# Patient Record
Sex: FEMALE | Race: BLACK OR AFRICAN AMERICAN | Employment: FULL TIME | URBAN - METROPOLITAN AREA
[De-identification: names, ages, dates, MRNs, and addresses within clinical notes are randomized per-mention and may not be internally consistent; named-entity substitution may affect disease eponyms.]

---

## 2018-07-31 PROBLEM — E66.01 SEVERE OBESITY (BMI 35.0-39.9): Status: ACTIVE | Noted: 2018-07-31

## 2018-08-02 ENCOUNTER — HOSPITAL ENCOUNTER (OUTPATIENT)
Dept: MAMMOGRAPHY | Age: 48
Discharge: HOME | End: 2018-08-02
Attending: INTERNAL MEDICINE
Payer: COMMERCIAL

## 2018-08-02 DIAGNOSIS — Z12.39 BREAST CANCER SCREENING: ICD-10-CM

## 2018-08-02 PROCEDURE — 77067 SCR MAMMO BI INCL CAD: CPT

## 2022-12-07 ENCOUNTER — HOSPITAL ENCOUNTER (EMERGENCY)
Age: 52
Discharge: HOME | End: 2022-12-08
Attending: EMERGENCY MEDICINE
Payer: COMMERCIAL

## 2022-12-07 ENCOUNTER — TELEPHONE (OUTPATIENT)
Dept: INTERNAL MEDICINE CLINIC | Facility: CLINIC | Age: 52
End: 2022-12-07

## 2022-12-07 ENCOUNTER — HOSPITAL ENCOUNTER (EMERGENCY)
Age: 52
Discharge: ANOTHER HOSPITAL | End: 2022-12-07
Attending: EMERGENCY MEDICINE
Payer: COMMERCIAL

## 2022-12-07 ENCOUNTER — OFFICE VISIT (OUTPATIENT)
Dept: INTERNAL MEDICINE CLINIC | Facility: CLINIC | Age: 52
End: 2022-12-07
Payer: COMMERCIAL

## 2022-12-07 VITALS
OXYGEN SATURATION: 96 % | SYSTOLIC BLOOD PRESSURE: 163 MMHG | WEIGHT: 161 LBS | DIASTOLIC BLOOD PRESSURE: 95 MMHG | TEMPERATURE: 98.7 F | BODY MASS INDEX: 31.61 KG/M2 | RESPIRATION RATE: 20 BRPM | HEIGHT: 60 IN | HEART RATE: 107 BPM

## 2022-12-07 VITALS
WEIGHT: 161 LBS | HEIGHT: 60 IN | SYSTOLIC BLOOD PRESSURE: 150 MMHG | BODY MASS INDEX: 31.61 KG/M2 | HEART RATE: 84 BPM | DIASTOLIC BLOOD PRESSURE: 84 MMHG

## 2022-12-07 DIAGNOSIS — D64.9 ANEMIA, UNSPECIFIED TYPE: Primary | ICD-10-CM

## 2022-12-07 DIAGNOSIS — D50.0 IRON DEFICIENCY ANEMIA DUE TO CHRONIC BLOOD LOSS: ICD-10-CM

## 2022-12-07 DIAGNOSIS — E11.9 DIABETES MELLITUS WITHOUT COMPLICATION (HCC): ICD-10-CM

## 2022-12-07 DIAGNOSIS — I10 ESSENTIAL HYPERTENSION WITH GOAL BLOOD PRESSURE LESS THAN 140/90: Primary | ICD-10-CM

## 2022-12-07 DIAGNOSIS — E04.0 GOITER DIFFUSE: ICD-10-CM

## 2022-12-07 LAB
ALBUMIN SERPL-MCNC: 4.3 G/DL (ref 3.5–5)
ALBUMIN/GLOB SERPL: 1.7 (ref 0.4–1.6)
ALP SERPL-CCNC: 77 U/L (ref 45–117)
ALT SERPL-CCNC: 7 U/L (ref 13–61)
ANION GAP SERPL CALC-SCNC: 12 MMOL/L (ref 2–11)
AST SERPL-CCNC: 19 U/L (ref 15–37)
BASOPHILS # BLD: 0.1 K/UL (ref 0–0.2)
BASOPHILS NFR BLD: 1 % (ref 0–2)
BILIRUB SERPL-MCNC: 0.3 MG/DL (ref 0.2–1.1)
BUN SERPL-MCNC: 6 MG/DL (ref 7–18)
CALCIUM SERPL-MCNC: 9.2 MG/DL (ref 8.3–10.4)
CHLORIDE SERPL-SCNC: 104 MMOL/L (ref 98–107)
CO2 SERPL-SCNC: 22 MMOL/L (ref 21–32)
CREAT SERPL-MCNC: 0.87 MG/DL (ref 0.6–1)
DIFFERENTIAL METHOD BLD: ABNORMAL
EOSINOPHIL # BLD: 0.2 K/UL (ref 0–0.8)
EOSINOPHIL NFR BLD: 2 % (ref 0.5–7.8)
ERYTHROCYTE [DISTWIDTH] IN BLOOD BY AUTOMATED COUNT: 32.5 % (ref 11.9–14.6)
ERYTHROCYTE [DISTWIDTH] IN BLOOD BY AUTOMATED COUNT: 32.7 % (ref 11.9–14.6)
GLOBULIN SER CALC-MCNC: 2.6 G/DL (ref 2.8–4.5)
GLUCOSE SERPL-MCNC: 178 MG/DL (ref 65–100)
HCT VFR BLD AUTO: 15.6 % (ref 35.8–46.3)
HCT VFR BLD AUTO: 17.1 % (ref 35.8–46.3)
HGB BLD-MCNC: 4.1 G/DL (ref 11.7–15.4)
HGB BLD-MCNC: 4.3 G/DL (ref 11.7–15.4)
IMM GRANULOCYTES # BLD AUTO: 0.1 K/UL (ref 0–0.5)
IMM GRANULOCYTES NFR BLD AUTO: 1 % (ref 0–5)
LYMPHOCYTES # BLD: 2.1 K/UL (ref 0.5–4.6)
LYMPHOCYTES NFR BLD: 26 % (ref 13–44)
MCH RBC QN AUTO: 15.8 PG (ref 26.1–32.9)
MCH RBC QN AUTO: 15.9 PG (ref 26.1–32.9)
MCHC RBC AUTO-ENTMCNC: 25.1 G/DL (ref 31.4–35)
MCHC RBC AUTO-ENTMCNC: 26.3 G/DL (ref 31.4–35)
MCV RBC AUTO: 60.5 FL (ref 82–102)
MCV RBC AUTO: 62.6 FL (ref 82–102)
MONOCYTES # BLD: 0.6 K/UL (ref 0.1–1.3)
MONOCYTES NFR BLD: 7 % (ref 4–12)
NEUTS SEG # BLD: 5.1 K/UL (ref 1.7–8.2)
NEUTS SEG NFR BLD: 63 % (ref 43–78)
NRBC # BLD: 0.02 K/UL (ref 0–0.2)
NRBC # BLD: 0.05 K/UL (ref 0–0.2)
PLATELET # BLD AUTO: 555 K/UL (ref 150–450)
PLATELET # BLD AUTO: 624 K/UL (ref 150–450)
PLATELET COMMENT: ABNORMAL
PMV BLD AUTO: ABNORMAL FL (ref 9.4–12.3)
PMV BLD AUTO: ABNORMAL FL (ref 9.4–12.3)
POTASSIUM SERPL-SCNC: 4 MMOL/L (ref 3.5–5.1)
PROT SERPL-MCNC: 6.9 G/DL (ref 6.4–8.2)
RBC # BLD AUTO: 2.58 M/UL (ref 4.05–5.2)
RBC # BLD AUTO: 2.73 M/UL (ref 4.05–5.2)
RBC MORPH BLD: ABNORMAL
RBC MORPH BLD: ABNORMAL
SODIUM SERPL-SCNC: 138 MMOL/L (ref 133–143)
WBC # BLD AUTO: 8.2 K/UL (ref 4.3–11.1)
WBC # BLD AUTO: 8.4 K/UL (ref 4.3–11.1)

## 2022-12-07 PROCEDURE — 3077F SYST BP >= 140 MM HG: CPT | Performed by: INTERNAL MEDICINE

## 2022-12-07 PROCEDURE — 99285 EMERGENCY DEPT VISIT HI MDM: CPT

## 2022-12-07 PROCEDURE — 3079F DIAST BP 80-89 MM HG: CPT | Performed by: INTERNAL MEDICINE

## 2022-12-07 PROCEDURE — 85027 COMPLETE CBC AUTOMATED: CPT

## 2022-12-07 PROCEDURE — 99204 OFFICE O/P NEW MOD 45 MIN: CPT | Performed by: INTERNAL MEDICINE

## 2022-12-07 PROCEDURE — 80053 COMPREHEN METABOLIC PANEL: CPT

## 2022-12-07 RX ORDER — CHLORTHALIDONE 25 MG/1
25 TABLET ORAL DAILY
Qty: 30 TABLET | Refills: 5 | Status: SHIPPED | OUTPATIENT
Start: 2022-12-07

## 2022-12-07 RX ORDER — FERROUS SULFATE 325(65) MG
325 TABLET ORAL EVERY OTHER DAY
Qty: 30 TABLET | Refills: 5 | Status: SHIPPED | OUTPATIENT
Start: 2022-12-07

## 2022-12-07 RX ORDER — AMLODIPINE BESYLATE 5 MG/1
5 TABLET ORAL DAILY
Qty: 30 TABLET | Refills: 5 | Status: SHIPPED | OUTPATIENT
Start: 2022-12-07

## 2022-12-07 RX ORDER — SODIUM CHLORIDE 9 MG/ML
INJECTION, SOLUTION INTRAVENOUS PRN
Status: DISCONTINUED | OUTPATIENT
Start: 2022-12-07 | End: 2022-12-08 | Stop reason: HOSPADM

## 2022-12-07 ASSESSMENT — ENCOUNTER SYMPTOMS
DIARRHEA: 0
ABDOMINAL PAIN: 0
NAUSEA: 0
SORE THROAT: 0
RHINORRHEA: 0
COLOR CHANGE: 0
SHORTNESS OF BREATH: 1
COUGH: 0
SHORTNESS OF BREATH: 1
WHEEZING: 0
BACK PAIN: 0
SHORTNESS OF BREATH: 1
CONSTIPATION: 0
VOMITING: 0

## 2022-12-07 ASSESSMENT — PATIENT HEALTH QUESTIONNAIRE - PHQ9
SUM OF ALL RESPONSES TO PHQ QUESTIONS 1-9: 0
SUM OF ALL RESPONSES TO PHQ9 QUESTIONS 1 & 2: 0
1. LITTLE INTEREST OR PLEASURE IN DOING THINGS: 0
SUM OF ALL RESPONSES TO PHQ QUESTIONS 1-9: 0
2. FEELING DOWN, DEPRESSED OR HOPELESS: 0
SUM OF ALL RESPONSES TO PHQ QUESTIONS 1-9: 0
SUM OF ALL RESPONSES TO PHQ QUESTIONS 1-9: 0

## 2022-12-07 ASSESSMENT — PAIN - FUNCTIONAL ASSESSMENT
PAIN_FUNCTIONAL_ASSESSMENT: NONE - DENIES PAIN
PAIN_FUNCTIONAL_ASSESSMENT: NONE - DENIES PAIN

## 2022-12-07 NOTE — PROGRESS NOTES
SUBJECTIVE:   Phil Guillaume is a 50 y.o. female seen for a visit regarding   Chief Complaint   Patient presents with    Hypertension     Pt here for f/u . Pt has not taken any of her meds in over a year. Norvasc 10 mg, Hygroton 25 mg or her iron 326 mg . Pt is fasting today         Hypertension  This is a chronic (had been on amlodipine and chlorthalidone for years-had covid 9/2021 and got a month supply then but out since then -a year-not checked it any-felt ok) problem. The current episode started more than 1 year ago (last here 7/2018). The problem has been gradually worsening since onset. Associated symptoms include shortness of breath (mild with exertion). Pertinent negatives include no chest pain. Diabetes  She presents for her follow-up diabetic visit. She has type 2 diabetes mellitus. Pertinent negatives for diabetes include no chest pain. When asked about current treatments, none were reported. Home blood sugar record trend: A1C 8/2018 at 7.1-never did take the metformin -no checks since then. Past Medical History, Past Surgical History, Family history, Social History, and Medications were all reviewed with the patient today and updated as necessary. Current Outpatient Medications   Medication Sig Dispense Refill    amLODIPine (NORVASC) 5 MG tablet Take 1 tablet by mouth daily 30 tablet 5    chlorthalidone (HYGROTON) 25 MG tablet Take 1 tablet by mouth daily 30 tablet 5     No current facility-administered medications for this visit.      No Known Allergies  Patient Active Problem List   Diagnosis    Essential hypertension with goal blood pressure less than 140/90    Iron deficiency anemia due to chronic blood loss    Sickle cell trait (HCC)    Impaired fasting glucose    AMA (advanced maternal age) multigravida 35+    Hx gestational diabetes     Social History     Tobacco Use    Smoking status: Not on file    Smokeless tobacco: Not on file   Substance Use Topics    Alcohol use: Not on file          Review of Systems   Constitutional:  Negative for unexpected weight change (about 30 lb lower than 2018). Respiratory:  Positive for shortness of breath (mild with exertion). Cardiovascular:  Negative for chest pain and leg swelling. Endocrine: Negative for cold intolerance and heat intolerance. Genitourinary:  Positive for menstrual problem (mense 7d -mod to heavy). Negative for frequency. Nocturia x1   Hematological:         Hx iron deficiency anemia back to 2017-low ferritin -took iron --not taken in over a year       OBJECTIVE:  BP (!) 150/84   Pulse 84   Ht 5' (1.524 m)   Wt 161 lb (73 kg)   LMP 12/01/2022   BMI 31.44 kg/m²      Physical Exam  Constitutional:       General: She is not in acute distress. Appearance: Normal appearance. Eyes:      Comments: Pale conjunctiva   Neck:      Thyroid: Thyromegaly (large goiter-not tender) present. Cardiovascular:      Rate and Rhythm: Normal rate and regular rhythm. Heart sounds: No murmur heard. Pulmonary:      Breath sounds: No wheezing or rhonchi. Skin:     Coloration: Skin is pale. Medical problems and test results were reviewed with the patient today. ASSESSMENT and PLAN     1. Essential hypertension with goal blood pressure less than 140/90  -     amLODIPine (NORVASC) 5 MG tablet; Take 1 tablet by mouth daily, Disp-30 tablet, R-5Normal  -     chlorthalidone (HYGROTON) 25 MG tablet; Take 1 tablet by mouth daily, Disp-30 tablet, R-5Normal  2. Iron deficiency anemia due to chronic blood loss  -     Ferritin; Future  3. Goiter diffuse  -     TSH with Reflex; Future  4. Diabetes mellitus without complication ([facility]  -     CBC with Auto Differential; Future  -     Comprehensive Metabolic Panel; Future  -     Lipid Panel;  Future  -     Hemoglobin A1C; Future   Several issues --for BP restart prior meds ; recheck A1C, glucose-weight down hopefully has helped; goiter moderate -no compressive sx -check TSH-pale and has sx -likely moderate anemia at least --restart iron and follow in a month  lab results and schedule for future lab studies reviewed with patient  reviewed medications and side effects in detail     Return in about 1 month (around 1/7/2023) for For BP recheck.

## 2022-12-08 ENCOUNTER — TELEPHONE (OUTPATIENT)
Dept: INTERNAL MEDICINE CLINIC | Facility: CLINIC | Age: 52
End: 2022-12-08

## 2022-12-08 VITALS
RESPIRATION RATE: 16 BRPM | DIASTOLIC BLOOD PRESSURE: 102 MMHG | HEART RATE: 91 BPM | SYSTOLIC BLOOD PRESSURE: 176 MMHG | TEMPERATURE: 98.6 F | OXYGEN SATURATION: 100 %

## 2022-12-08 DIAGNOSIS — D50.0 IRON DEFICIENCY ANEMIA DUE TO CHRONIC BLOOD LOSS: Primary | ICD-10-CM

## 2022-12-08 LAB
ALBUMIN SERPL-MCNC: 4.2 G/DL (ref 3.5–5)
ALBUMIN/GLOB SERPL: 1.4 (ref 0.4–1.6)
ALP SERPL-CCNC: 75 U/L (ref 50–136)
ALT SERPL-CCNC: 16 U/L (ref 12–65)
ANION GAP SERPL CALC-SCNC: 5 MMOL/L (ref 2–11)
AST SERPL-CCNC: 16 U/L (ref 15–37)
BILIRUB SERPL-MCNC: 0.3 MG/DL (ref 0.2–1.1)
BUN SERPL-MCNC: 6 MG/DL (ref 6–23)
CALCIUM SERPL-MCNC: 8.7 MG/DL (ref 8.3–10.4)
CHLORIDE SERPL-SCNC: 110 MMOL/L (ref 101–110)
CHOLEST SERPL-MCNC: 179 MG/DL
CO2 SERPL-SCNC: 24 MMOL/L (ref 21–32)
CREAT SERPL-MCNC: 1 MG/DL (ref 0.6–1)
FERRITIN SERPL-MCNC: 2 NG/ML (ref 8–388)
GLOBULIN SER CALC-MCNC: 3 G/DL (ref 2.8–4.5)
GLUCOSE SERPL-MCNC: 93 MG/DL (ref 65–100)
HCT VFR BLD AUTO: 17.3 % (ref 35.8–46.3)
HCT VFR BLD AUTO: 27 % (ref 35.8–46.3)
HDLC SERPL-MCNC: 45 MG/DL (ref 40–60)
HDLC SERPL: 4
HGB BLD-MCNC: 4.4 G/DL (ref 11.7–15.4)
HGB BLD-MCNC: 8 G/DL (ref 11.7–15.4)
HISTORY CHECK: NORMAL
LDLC SERPL CALC-MCNC: 111.8 MG/DL
POTASSIUM SERPL-SCNC: 4.5 MMOL/L (ref 3.5–5.1)
PROT SERPL-MCNC: 7.2 G/DL (ref 6.3–8.2)
SODIUM SERPL-SCNC: 139 MMOL/L (ref 133–143)
TRIGL SERPL-MCNC: 111 MG/DL (ref 35–150)
TSH W FREE THYROID IF ABNORMAL: 0.53 UIU/ML (ref 0.36–3.74)
VLDLC SERPL CALC-MCNC: 22.2 MG/DL (ref 6–23)

## 2022-12-08 PROCEDURE — 85014 HEMATOCRIT: CPT

## 2022-12-08 PROCEDURE — 86923 COMPATIBILITY TEST ELECTRIC: CPT

## 2022-12-08 PROCEDURE — 36430 TRANSFUSION BLD/BLD COMPNT: CPT

## 2022-12-08 PROCEDURE — P9016 RBC LEUKOCYTES REDUCED: HCPCS

## 2022-12-08 PROCEDURE — 86901 BLOOD TYPING SEROLOGIC RH(D): CPT

## 2022-12-08 NOTE — ED TRIAGE NOTES
Arrives ambulatory with steady gait into triage. Reports seen by PMD today for refill on bp meds, lab work done at that time. Called tonight r/t hemoglobin 4. Denies hx of same. Reports shortness of breath when going upstairs only. Reports fatigue. Denies chest pain, n/v/d, fever/chills. Currently on menstrual cycle and reports heavy periods. Denies bloody/black stools or abdominal pain. Started on iron pills today, taken 1.5 hours pta. Mucuos membranes appear pale in triage.

## 2022-12-08 NOTE — ED NOTES
I have reviewed discharge instructions with the patient. The patient verbalized understanding. Patient left ED via Discharge Method: ambulatory to Home with  who will be coming to pick her up. Opportunity for questions and clarification provided. Patient given 0 scripts. To continue your aftercare when you leave the hospital, you may receive an automated call from our care team to check in on how you are doing. This is a free service and part of our promise to provide the best care and service to meet your aftercare needs.  If you have questions, or wish to unsubscribe from this service please call [phone]. Thank you for Choosing our [facility] Emergency Department.       Janelle Momin RN  12/08/22 1991

## 2022-12-08 NOTE — TELEPHONE ENCOUNTER
FYI: Patient was sent to the ER yesterday due to low hemoglobin results (4.3). She went to the hospital and it reported as 4.1 and was advise to go ahead and get a transfusion. She had the transfusion this morning (2 bags) and her hemoglobin reported as 8. The hospital is requesting that she comes back sooner than January due to these results.  Scheduled anappointment for 12/12

## 2022-12-08 NOTE — ED PROVIDER NOTES
Emergency Department Provider Note                   PCP:                Jennifer Espinoza MD               Age: 50 y.o. Sex: female       ICD-10-CM    1. Anemia, unspecified type  D64.9           DISPOSITION Decision To Transfer 12/07/2022 10:40:24 PM        MDM  Number of Diagnoses or Management Options  Anemia, unspecified type: new, needed workup     Amount and/or Complexity of Data Reviewed  Clinical lab tests: ordered and reviewed  Review and summarize past medical records: yes    Risk of Complications, Morbidity, and/or Mortality  Presenting problems: moderate  Diagnostic procedures: minimal  Management options: moderate    Patient Progress  Patient progress: stable             Orders Placed This Encounter   Procedures    CBC    Comprehensive Metabolic Panel    EKG 12 Lead    Insert peripheral IV        Medications - No data to display    New Prescriptions    No medications on file        Shan Cruz is a 50 y.o. female who presents to the Emergency Department with chief complaint of    Chief Complaint   Patient presents with    Abnormal Lab      82-year-old black female with a history of HTN, iron deficiency anemia, and chronic menorrhagia presents to the emergency department complaining of abnormal lab she had earlier today and was told to come to the ER. According the patient, she had been out of her blood pressure medicine and went to follow-up with her primary care doctor earlier today, had blood work done and was restarted on her blood pressure medicine. She was called back by someone in the office today and told she needed to go to the ER because her hemoglobin was too low. She denies any bleeding at this time and states she does admit to getting short of breath walking upstairs but minimal shortness of breath with daily activity and no chest pain. The history is provided by the patient and the spouse. Review of Systems   Constitutional:  Negative for chills and fever. Respiratory:  Positive for shortness of breath (with exertion). Negative for wheezing. Cardiovascular:  Negative for chest pain. All other systems reviewed and are negative. No past medical history on file. No past surgical history on file. No family history on file. Social History     Socioeconomic History    Marital status:          Patient has no known allergies. Previous Medications    AMLODIPINE (NORVASC) 5 MG TABLET    Take 1 tablet by mouth daily    CHLORTHALIDONE (HYGROTON) 25 MG TABLET    Take 1 tablet by mouth daily        Vitals signs and nursing note reviewed. Patient Vitals for the past 4 hrs:   Temp Pulse Resp BP SpO2   12/07/22 2235 -- (!) 107 20 (!) 163/95 96 %   12/07/22 2054 99.5 °F (37.5 °C) (!) 120 16 (!) 172/89 100 %          Physical Exam  Vitals and nursing note reviewed. Constitutional:       General: She is not in acute distress. HENT:      Head: Normocephalic and atraumatic. Right Ear: External ear normal.      Left Ear: External ear normal.      Nose: Nose normal.      Mouth/Throat:      Mouth: Mucous membranes are moist.   Eyes:      Extraocular Movements: Extraocular movements intact. Conjunctiva/sclera: Conjunctivae normal.      Pupils: Pupils are equal, round, and reactive to light. Cardiovascular:      Rate and Rhythm: Regular rhythm. Tachycardia present. Heart sounds: No murmur heard. Pulmonary:      Effort: Pulmonary effort is normal.      Breath sounds: Normal breath sounds. Abdominal:      General: Abdomen is flat. Palpations: There is no mass. Tenderness: There is no abdominal tenderness. There is no right CVA tenderness or left CVA tenderness. Musculoskeletal:         General: Normal range of motion. Cervical back: Normal range of motion and neck supple. Skin:     General: Skin is warm and dry. Coloration: Skin is pale. Neurological:      General: No focal deficit present.       Mental Status: She is alert and oriented to person, place, and time. Psychiatric:         Mood and Affect: Mood and affect normal.         Speech: Speech normal.        Procedures    The patient was observed in the ED. due to her extremely low hemoglobin as well as tachycardia and dyspnea on exertion, the patient be transferred over to [facility] for transfusion of 2 units of blood. Dr. Vicki Kimball will assume care on transfer, and arrange follow-up with Dr. Mckenzie Mccarthy as an outpatient. Patient be given a prescription for iron supplementation to be taken every other day. Results Reviewed:      Recent Results (from the past 24 hour(s))   CBC with Auto Differential    Collection Time: 12/07/22 11:35 AM   Result Value Ref Range    WBC 8.2 4.3 - 11.1 K/uL    RBC 2.73 (L) 4.05 - 5.2 M/uL    Hemoglobin 4.3 (LL) 11.7 - 15.4 g/dL    Hematocrit 17.1 (L) 35.8 - 46.3 %    MCV 62.6 (L) 82 - 102 FL    MCH 15.8 (L) 26.1 - 32.9 PG    MCHC 25.1 (L) 31.4 - 35.0 g/dL    RDW 32.7 (H) 11.9 - 14.6 %    Platelets 805 (H) 898 - 450 K/uL    MPV Unable to calculate. Recommend adding IPF.  9.4 - 12.3 FL    nRBC 0.02 0.0 - 0.2 K/uL    Differential Type AUTOMATED      Seg Neutrophils 63 43 - 78 %    Lymphocytes 26 13 - 44 %    Monocytes 7 4.0 - 12.0 %    Eosinophils % 2 0.5 - 7.8 %    Basophils 1 0.0 - 2.0 %    Immature Granulocytes 1 0.0 - 5.0 %    Segs Absolute 5.1 1.7 - 8.2 K/UL    Absolute Lymph # 2.1 0.5 - 4.6 K/UL    Absolute Mono # 0.6 0.1 - 1.3 K/UL    Absolute Eos # 0.2 0.0 - 0.8 K/UL    Basophils Absolute 0.1 0.0 - 0.2 K/UL    Absolute Immature Granulocyte 0.1 0.0 - 0.5 K/UL    RBC Comment HYPOCHROMIA      RBC Comment SLIGHT  ANISOCYTOSIS + POIKILOCYTOSIS        Platelet Comment INCREASED     CBC    Collection Time: 12/07/22  9:30 PM   Result Value Ref Range    WBC 8.4 4.3 - 11.1 K/uL    RBC 2.58 (L) 4.05 - 5.20 M/uL    Hemoglobin 4.1 (LL) 11.7 - 15.4 g/dL    Hematocrit 15.6 (L) 35.8 - 46.3 %    MCV 60.5 (L) 82.0 - 102.0 FL    MCH 15.9 (L) 26.1 - 32.9 PG MCHC 26.3 (L) 31.4 - 35.0 g/dL    RDW 32.5 (H) 11.9 - 14.6 %    Platelets 317 (H) 571 - 450 K/uL    MPV Unable to calculate. Recommend adding IPF. 9.4 - 12.3 FL    nRBC 0.05 0.0 - 0.2 K/uL   Comprehensive Metabolic Panel    Collection Time: 12/07/22  9:30 PM   Result Value Ref Range    Sodium 138 133 - 143 mmol/L    Potassium 4.0 3.5 - 5.1 mmol/L    Chloride 104 98 - 107 mmol/L    CO2 22 21 - 32 mmol/L    Anion Gap 12 (H) 2 - 11 mmol/L    Glucose 178 (H) 65 - 100 mg/dL    BUN 6 (L) 7.0 - 18.0 MG/DL    Creatinine 0.87 0.6 - 1.0 MG/DL    Est, Glom Filt Rate >60 >60 ml/min/1.73m2    Calcium 9.2 8.3 - 10.4 MG/DL    Total Bilirubin 0.3 0.2 - 1.1 MG/DL    ALT 7 (L) 13.0 - 61.0 U/L    AST 19 15 - 37 U/L    Alk Phosphatase 77 45.0 - 117.0 U/L    Total Protein 6.9 6.4 - 8.2 g/dL    Albumin 4.3 3.5 - 5.0 g/dL    Globulin 2.6 (L) 2.8 - 4.5 g/dL    Albumin/Globulin Ratio 1.7 (H) 0.4 - 1.6         I discussed the results of all labs, procedures, radiographs, and treatments with the patient and available family. Treatment plan is agreed upon and the patient is ready for discharge. All voiced understanding of the discharge plan and medication instructions or changes as appropriate. Questions about treatment in the ED were answered. All were encouraged to return should symptoms worsen or new problems develop. Voice dictation software was used during the making of this note. This software is not perfect and grammatical and other typographical errors may be present. This note has not been completely proofread for errors.      Jignesh Pelaez MD  12/07/22 [id number]

## 2022-12-08 NOTE — ED NOTES
TRANSFER - OUT REPORT:    Verbal report given to Michael Nolan RN  on Aissatou Blade  being transferred to [facility] [address] ED for routine progression of patient care       Report consisted of patient's Situation, Background, Assessment and   Recommendations(SBAR). Information from the following report(s) Nurse Handoff Report, ED Encounter Summary, Adult Overview, Recent Results, and Cardiac Rhythm ST  was reviewed with the receiving nurse. [place] Assessment: Presents to emergency department  because of falls (Syncope, seizure, or loss of consciousness): No, Age > 79: No, Altered Mental Status, Intoxication with alcohol or substance confusion (Disorientation, impaired judgment, poor safety awaremess, or inability to follow instructions): No, Impaired Mobility: Ambulates or transfers with assistive devices or assistance; Unable to ambulate or transer.: No  Lines:   Peripheral IV 12/07/22 Left Antecubital (Active)   Site Assessment Clean, dry & intact 12/07/22 2132   Line Status Blood return noted; Flushed;Normal saline locked;Brisk blood return 12/07/22 2132   Phlebitis Assessment No symptoms 12/07/22 2132   Infiltration Assessment 0 12/07/22 2132   Alcohol Cap Used No 12/07/22 2132   Dressing Status New dressing applied;Clean, dry & intact 12/07/22 2132   Dressing Type Transparent 12/07/22 2132   Dressing Intervention Dressing changed;New 12/07/22 2132        Opportunity for questions and clarification was provided.       Patient transported with:  Monitor michell Rashid RN  12/07/22 [id number]

## 2022-12-08 NOTE — ED PROVIDER NOTES
Emergency Department Provider Note                   PCP:                Christiano Stephens MD               Age: 50 y.o. Sex: female       ICD-10-CM    1. Anemia, unspecified type  D64.9           DISPOSITION Decision To Discharge 12/08/2022 07:20:11 AM       MDM  Number of Diagnoses or Management Options  Anemia, unspecified type  Diagnosis management comments: Patient was severely anemic with some weakness and fatigue. After 2 units of blood patient's hemoglobin is now 8. She feels much better. We will have her continue her iron supplements and follow-up with Dr. Uli Orozco closely for further monitoring and evaluation of her anemia. Amount and/or Complexity of Data Reviewed  Clinical lab tests: ordered and reviewed  Tests in the medicine section of CPT®: ordered and reviewed    Patient Progress  Patient progress: stable             Orders Placed This Encounter   Procedures    Hemoglobin and Hematocrit    Hemoglobin and Hematocrit    Verify hospital blood product consent form has been signed and witnessed    Vital Signs For Blood Product Transfusion    Transfusion Reaction Management    TYPE AND SCREEN    PREPARE RBC (CROSSMATCH), 2 Units        Medications   0.9 % sodium chloride infusion (has no administration in time range)       New Prescriptions    No medications on file        Sanya Lopez is a 50 y.o. female who presents to the Emergency Department with chief complaint of    Chief Complaint   Patient presents with    Abnormal Lab      Patient with a history of iron deficiency anemia. She has taken iron supplements in the past but none currently. She also has very heavy menstrual periods which usually last 7 days. She still has these every month and is just finishing up her seventh day this month. States the bleeding was very heavy but is currently light and almost finished.   She went to her primary care doctor to reestablish care today specifically to get a refill of her blood pressure medication. They check some routine blood work and called her this evening with a low hemoglobin of 4.3. Patient went to the ER at [facility]. They rechecked and confirmed hemoglobin of 4.1. They do not have blood products there and sent patient here to the ER for a blood transfusion and possible discharge with PCP follow-up. I checked patient's last hemoglobin in the records and it was in 2018 with a hemoglobin of 11. There is no recent blood work noted. Patient states she has some fatigue and shortness of breath mainly with going up stairs. Otherwise she is doing well. I discussed options of blood transfusion and repeat CBC and options of being admitted versus being discharged. She would like to get the blood transfusion and recheck hemoglobin and hopefully be discharged. The history is provided by the patient. No  was used. Abnormal Lab  This is a new problem. The current episode started 3 to 5 hours ago. The problem occurs constantly. The problem has not changed since onset. Associated symptoms include shortness of breath. Pertinent negatives include no chest pain, no abdominal pain and no headaches. Nothing aggravates the symptoms. Nothing relieves the symptoms. She has tried nothing for the symptoms. All other systems reviewed and are negative unless otherwise stated in the history of present illness section. Review of Systems   Constitutional:  Positive for fatigue. Negative for chills and fever. HENT:  Negative for rhinorrhea and sore throat. Respiratory:  Positive for shortness of breath. Negative for cough. Cardiovascular:  Negative for chest pain and palpitations. Gastrointestinal:  Negative for abdominal pain, constipation, diarrhea, nausea and vomiting. Genitourinary:  Positive for vaginal bleeding. Negative for dysuria and hematuria. Musculoskeletal:  Negative for back pain and neck pain. Skin:  Negative for color change and rash. Neurological:  Negative for numbness and headaches. Psychiatric/Behavioral:  Negative for confusion. All other systems reviewed and are negative. No past medical history on file. No past surgical history on file. No family history on file. Social History     Socioeconomic History    Marital status:         Allergies: Patient has no known allergies. Previous Medications    AMLODIPINE (NORVASC) 5 MG TABLET    Take 1 tablet by mouth daily    CHLORTHALIDONE (HYGROTON) 25 MG TABLET    Take 1 tablet by mouth daily    FERROUS SULFATE (IRON 325) 325 (65 FE) MG TABLET    Take 1 tablet by mouth every other day        Vitals signs and nursing note reviewed. Patient Vitals for the past 4 hrs:   Temp Pulse Resp BP SpO2   12/08/22 0602 -- 93 -- (!) 178/89 100 %   12/08/22 0501 -- 87 -- (!) 170/81 99 %   12/08/22 0432 -- 80 -- (!) 178/86 100 %   12/08/22 0400 -- 84 -- (!) 164/91 100 %   12/08/22 0356 -- 85 -- (!) 163/91 100 %   12/08/22 0352 -- 83 -- (!) 165/101 100 %   12/08/22 0347 98.6 °F (37 °C) 84 16 (!) 163/95 100 %          Physical Exam  Vitals and nursing note reviewed. Constitutional:       Appearance: Normal appearance. HENT:      Head: Normocephalic and atraumatic. Eyes:      Comments: Pale conjunctivae     Cardiovascular:      Rate and Rhythm: Regular rhythm. Tachycardia present. Pulmonary:      Effort: Pulmonary effort is normal. No respiratory distress. Breath sounds: Normal breath sounds. No wheezing. Abdominal:      General: Bowel sounds are normal.      Palpations: Abdomen is soft. Tenderness: There is no abdominal tenderness. Musculoskeletal:         General: No swelling. Normal range of motion. Cervical back: Normal range of motion. No tenderness. Skin:     General: Skin is warm and dry. Neurological:      Mental Status: She is alert.         Procedures        Results for orders placed or performed during the hospital encounter of 12/07/22 Hemoglobin and Hematocrit   Result Value Ref Range    Hemoglobin 4.4 (LL) 11.7 - 15.4 g/dL    Hematocrit 17.3 (L) 35.8 - 46.3 %   Hemoglobin and Hematocrit   Result Value Ref Range    Hemoglobin 8.0 (L) 11.7 - 15.4 g/dL    Hematocrit 27.0 (L) 35.8 - 46.3 %   TYPE AND SCREEN   Result Value Ref Range    Crossmatch expiration date 12/11/2022,2359     ABO/Rh O POSITIVE     Antibody Screen NEG     Unit Number [id number]     Product Code Blood Bank RC LR     Unit Divison 00     Dispense Status Blood Bank ISSUED     Crossmatch Result Compatible     Unit Number [id number]     Product Code Blood Bank RC LR     Unit Divison 00     Dispense Status Blood Bank ISSUED     Crossmatch Result Compatible    PREPARE RBC (CROSSMATCH), 2 Units   Result Value Ref Range    History Check Historical check performed         No orders to display                         Voice dictation software was used during the making of this note. This software is not perfect and grammatical and other typographical errors may be present. This note has not been completely proofread for errors.      Ludin Lewis III, MD  12/08/22 [id number]

## 2022-12-08 NOTE — PROGRESS NOTES
Spoke to patient on the phone. She endorsed worsening shortness of breath and fatigue. Denies and known gi blood loss. Recommended she be seen in the ED for a recheck of lab, likely transfusion, and further workup. She endorses understanding and stated she would go now. All questions answered.

## 2022-12-08 NOTE — TELEPHONE ENCOUNTER
Spoke with pt and advised that per Dr. Cara Hicks, he reviewed lab results and suggests a CBC recheck mid next week. Pt agreed. Appt cancelled and pt aware.

## 2022-12-09 LAB
ABO + RH BLD: NORMAL
BLD PROD TYP BPU: NORMAL
BLD PROD TYP BPU: NORMAL
BLOOD BANK DISPENSE STATUS: NORMAL
BLOOD BANK DISPENSE STATUS: NORMAL
BLOOD GROUP ANTIBODIES SERPL: NORMAL
BPU ID: NORMAL
BPU ID: NORMAL
CROSSMATCH RESULT: NORMAL
CROSSMATCH RESULT: NORMAL
SPECIMEN EXP DATE BLD: NORMAL
UNIT DIVISION: 0
UNIT DIVISION: 0

## 2022-12-11 LAB
EST. AVERAGE GLUCOSE BLD GHB EST-MCNC: ABNORMAL MG/DL
HBA1C MFR BLD: <3.8 % (ref 4.8–5.6)

## 2022-12-13 DIAGNOSIS — D50.0 IRON DEFICIENCY ANEMIA DUE TO CHRONIC BLOOD LOSS: ICD-10-CM

## 2022-12-13 LAB
BASOPHILS # BLD: 0.1 K/UL (ref 0–0.2)
BASOPHILS NFR BLD: 1 % (ref 0–2)
DIFFERENTIAL METHOD BLD: ABNORMAL
EOSINOPHIL # BLD: 0.1 K/UL (ref 0–0.8)
EOSINOPHIL NFR BLD: 2 % (ref 0.5–7.8)
ERYTHROCYTE [DISTWIDTH] IN BLOOD BY AUTOMATED COUNT: ABNORMAL %
HCT VFR BLD AUTO: 31 % (ref 35.8–46.3)
HGB BLD-MCNC: 8.3 G/DL (ref 11.7–15.4)
IMM GRANULOCYTES # BLD AUTO: 0 K/UL (ref 0–0.5)
IMM GRANULOCYTES NFR BLD AUTO: 0 % (ref 0–5)
LYMPHOCYTES # BLD: 1.7 K/UL (ref 0.5–4.6)
LYMPHOCYTES NFR BLD: 23 % (ref 13–44)
MCH RBC QN AUTO: 19.3 PG (ref 26.1–32.9)
MCHC RBC AUTO-ENTMCNC: 26.8 G/DL (ref 31.4–35)
MCV RBC AUTO: 72.3 FL (ref 82–102)
MONOCYTES # BLD: 0.5 K/UL (ref 0.1–1.3)
MONOCYTES NFR BLD: 7 % (ref 4–12)
NEUTS SEG # BLD: 5 K/UL (ref 1.7–8.2)
NEUTS SEG NFR BLD: 67 % (ref 43–78)
NRBC # BLD: 0 K/UL (ref 0–0.2)
PLATELET # BLD AUTO: 612 K/UL (ref 150–450)
PLATELET COMMENT: ABNORMAL
PMV BLD AUTO: ABNORMAL FL (ref 9.4–12.3)
RBC # BLD AUTO: 4.29 M/UL (ref 4.05–5.2)
RBC MORPH BLD: ABNORMAL
WBC # BLD AUTO: 7.4 K/UL (ref 4.3–11.1)
WBC MORPH BLD: ABNORMAL

## 2022-12-14 ENCOUNTER — TELEPHONE (OUTPATIENT)
Dept: INTERNAL MEDICINE CLINIC | Facility: CLINIC | Age: 52
End: 2022-12-14

## 2022-12-14 DIAGNOSIS — D50.0 IRON DEFICIENCY ANEMIA DUE TO CHRONIC BLOOD LOSS: Primary | ICD-10-CM

## 2022-12-14 DIAGNOSIS — D64.9 LOW HEMOGLOBIN: ICD-10-CM

## 2022-12-14 NOTE — TELEPHONE ENCOUNTER
----- Message from Renelle Aschoff, MD sent at 12/14/2022  7:52 AM EST -----  Hb up to 8.3 now-check cbc in 2 weeks

## 2022-12-14 NOTE — TELEPHONE ENCOUNTER
Pt was made aware of her HB still on the low side but improving and pt is aware to repeat CBC in 2 weeks

## 2023-01-09 DIAGNOSIS — D64.9 LOW HEMOGLOBIN: ICD-10-CM

## 2023-01-09 DIAGNOSIS — D50.0 IRON DEFICIENCY ANEMIA DUE TO CHRONIC BLOOD LOSS: ICD-10-CM

## 2023-01-10 ENCOUNTER — OFFICE VISIT (OUTPATIENT)
Dept: INTERNAL MEDICINE CLINIC | Facility: CLINIC | Age: 53
End: 2023-01-10
Payer: COMMERCIAL

## 2023-01-10 VITALS
BODY MASS INDEX: 32.39 KG/M2 | HEIGHT: 60 IN | SYSTOLIC BLOOD PRESSURE: 130 MMHG | WEIGHT: 165 LBS | DIASTOLIC BLOOD PRESSURE: 85 MMHG

## 2023-01-10 DIAGNOSIS — D50.0 IRON DEFICIENCY ANEMIA DUE TO CHRONIC BLOOD LOSS: Primary | ICD-10-CM

## 2023-01-10 DIAGNOSIS — I10 ESSENTIAL HYPERTENSION WITH GOAL BLOOD PRESSURE LESS THAN 140/90: ICD-10-CM

## 2023-01-10 LAB
BASOPHILS # BLD: 0.1 K/UL (ref 0–0.2)
BASOPHILS NFR BLD: 1 % (ref 0–2)
DIFFERENTIAL METHOD BLD: ABNORMAL
EOSINOPHIL # BLD: 0.1 K/UL (ref 0–0.8)
EOSINOPHIL NFR BLD: 2 % (ref 0.5–7.8)
ERYTHROCYTE [DISTWIDTH] IN BLOOD BY AUTOMATED COUNT: 26.4 % (ref 11.9–14.6)
HCT VFR BLD AUTO: 35.8 % (ref 35.8–46.3)
HGB BLD-MCNC: 10.7 G/DL (ref 11.7–15.4)
IMM GRANULOCYTES # BLD AUTO: 0 K/UL (ref 0–0.5)
IMM GRANULOCYTES NFR BLD AUTO: 0 % (ref 0–5)
LYMPHOCYTES # BLD: 1.5 K/UL (ref 0.5–4.6)
LYMPHOCYTES NFR BLD: 26 % (ref 13–44)
MCH RBC QN AUTO: 24.1 PG (ref 26.1–32.9)
MCHC RBC AUTO-ENTMCNC: 29.9 G/DL (ref 31.4–35)
MCV RBC AUTO: 80.6 FL (ref 82–102)
MONOCYTES # BLD: 0.4 K/UL (ref 0.1–1.3)
MONOCYTES NFR BLD: 8 % (ref 4–12)
NEUTS SEG # BLD: 3.5 K/UL (ref 1.7–8.2)
NEUTS SEG NFR BLD: 62 % (ref 43–78)
NRBC # BLD: 0 K/UL (ref 0–0.2)
PLATELET # BLD AUTO: 261 K/UL (ref 150–450)
PMV BLD AUTO: ABNORMAL FL (ref 9.4–12.3)
RBC # BLD AUTO: 4.44 M/UL (ref 4.05–5.2)
WBC # BLD AUTO: 5.7 K/UL (ref 4.3–11.1)

## 2023-01-10 PROCEDURE — 3079F DIAST BP 80-89 MM HG: CPT | Performed by: INTERNAL MEDICINE

## 2023-01-10 PROCEDURE — 3075F SYST BP GE 130 - 139MM HG: CPT | Performed by: INTERNAL MEDICINE

## 2023-01-10 PROCEDURE — 99213 OFFICE O/P EST LOW 20 MIN: CPT | Performed by: INTERNAL MEDICINE

## 2023-01-10 ASSESSMENT — ENCOUNTER SYMPTOMS
SHORTNESS OF BREATH: 0
COUGH: 0

## 2023-01-10 ASSESSMENT — PATIENT HEALTH QUESTIONNAIRE - PHQ9
SUM OF ALL RESPONSES TO PHQ9 QUESTIONS 1 & 2: 0
1. LITTLE INTEREST OR PLEASURE IN DOING THINGS: 0
SUM OF ALL RESPONSES TO PHQ QUESTIONS 1-9: 0
2. FEELING DOWN, DEPRESSED OR HOPELESS: 0
SUM OF ALL RESPONSES TO PHQ QUESTIONS 1-9: 0

## 2023-01-10 NOTE — PROGRESS NOTES
SUBJECTIVE:   Derik Bojorquez is a 50 y.o. female seen for a visit regarding   Chief Complaint   Patient presents with    Hypertension     Pt here for one month f/u     Results     Lab was done yesterday         Hypertension  This is a chronic problem. The current episode started more than 1 year ago. The problem is unchanged. Pertinent negatives include no shortness of breath. Other  This is a new problem. The current episode started 1 to 4 weeks ago (in hospital overnight for transfusion for Hb about 4 early Dec --2 units-ferritin low -on iron qd now). Episode frequency: ferritin 2. Pertinent negatives include no coughing. Lab Results   Component Value Date    WBC 5.7 01/09/2023    HGB 10.7 (L) 01/09/2023    HCT 35.8 01/09/2023    MCV 80.6 (L) 01/09/2023     01/09/2023       Past Medical History, Past Surgical History, Family history, Social History, and Medications were all reviewed with the patient today and updated as necessary. Current Outpatient Medications   Medication Sig Dispense Refill    amLODIPine (NORVASC) 5 MG tablet Take 1 tablet by mouth daily 30 tablet 5    chlorthalidone (HYGROTON) 25 MG tablet Take 1 tablet by mouth daily 30 tablet 5    ferrous sulfate (IRON 325) 325 (65 Fe) MG tablet Take 1 tablet by mouth every other day (Patient taking differently: Take 325 mg by mouth daily (with breakfast)) 30 tablet 5     No current facility-administered medications for this visit. No Known Allergies  Patient Active Problem List   Diagnosis    Essential hypertension with goal blood pressure less than 140/90    Iron deficiency anemia due to chronic blood loss    Sickle cell trait (HCC)    Impaired fasting glucose    AMA (advanced maternal age) multigravida 35+    Hx gestational diabetes     Social History     Tobacco Use    Smoking status: Never    Smokeless tobacco: Never   Substance Use Topics    Alcohol use:  Yes     Alcohol/week: 1.0 standard drink     Types: 1 Glasses of wine per week          Review of Systems   Constitutional:  Negative for unexpected weight change. Respiratory:  Negative for cough and shortness of breath. Genitourinary:         Heavy menses -hx fibroids       OBJECTIVE:  /85   Ht 5' (1.524 m)   Wt 165 lb (74.8 kg)   LMP 01/01/2023 (Exact Date)   BMI 32.22 kg/m²      Physical Exam       Medical problems and test results were reviewed with the patient today. ASSESSMENT and PLAN     1. Iron deficiency anemia due to chronic blood loss  -     CBC with Auto Differential; Future  2. Essential hypertension with goal blood pressure less than 140/90   Can do qod iron since some constipation -discussed screening colonoscopy later--see 4 mo--plan 6 mo iron at least-the smaller dose but check later  lab results and schedule for future lab studies reviewed with patient  reviewed medications and side effects in detail     Return in about 4 months (around 5/10/2023) for For BP recheck.

## 2023-02-21 DIAGNOSIS — D50.0 IRON DEFICIENCY ANEMIA DUE TO CHRONIC BLOOD LOSS: ICD-10-CM

## 2023-02-21 LAB
BASOPHILS # BLD: 0 K/UL (ref 0–0.2)
BASOPHILS NFR BLD: 1 % (ref 0–2)
DIFFERENTIAL METHOD BLD: ABNORMAL
EOSINOPHIL # BLD: 0.1 K/UL (ref 0–0.8)
EOSINOPHIL NFR BLD: 2 % (ref 0.5–7.8)
ERYTHROCYTE [DISTWIDTH] IN BLOOD BY AUTOMATED COUNT: 15.8 % (ref 11.9–14.6)
HCT VFR BLD AUTO: 41 % (ref 35.8–46.3)
HGB BLD-MCNC: 12.9 G/DL (ref 11.7–15.4)
IMM GRANULOCYTES # BLD AUTO: 0 K/UL (ref 0–0.5)
IMM GRANULOCYTES NFR BLD AUTO: 0 % (ref 0–5)
LYMPHOCYTES # BLD: 1.6 K/UL (ref 0.5–4.6)
LYMPHOCYTES NFR BLD: 29 % (ref 13–44)
MCH RBC QN AUTO: 26.2 PG (ref 26.1–32.9)
MCHC RBC AUTO-ENTMCNC: 31.5 G/DL (ref 31.4–35)
MCV RBC AUTO: 83.3 FL (ref 82–102)
MONOCYTES # BLD: 0.4 K/UL (ref 0.1–1.3)
MONOCYTES NFR BLD: 7 % (ref 4–12)
NEUTS SEG # BLD: 3.4 K/UL (ref 1.7–8.2)
NEUTS SEG NFR BLD: 61 % (ref 43–78)
NRBC # BLD: 0 K/UL (ref 0–0.2)
PLATELET # BLD AUTO: 183 K/UL (ref 150–450)
PMV BLD AUTO: ABNORMAL FL (ref 9.4–12.3)
RBC # BLD AUTO: 4.92 M/UL (ref 4.05–5.2)
WBC # BLD AUTO: 5.5 K/UL (ref 4.3–11.1)

## 2023-06-02 DIAGNOSIS — I10 ESSENTIAL HYPERTENSION WITH GOAL BLOOD PRESSURE LESS THAN 140/90: ICD-10-CM

## 2023-06-02 RX ORDER — AMLODIPINE BESYLATE 5 MG/1
TABLET ORAL
Qty: 90 TABLET | Refills: 3 | Status: SHIPPED | OUTPATIENT
Start: 2023-06-02

## 2023-06-02 RX ORDER — CHLORTHALIDONE 25 MG/1
TABLET ORAL
Qty: 90 TABLET | Refills: 3 | Status: SHIPPED | OUTPATIENT
Start: 2023-06-02

## 2025-05-13 SDOH — HEALTH STABILITY: PHYSICAL HEALTH: ON AVERAGE, HOW MANY DAYS PER WEEK DO YOU ENGAGE IN MODERATE TO STRENUOUS EXERCISE (LIKE A BRISK WALK)?: 2 DAYS

## 2025-05-13 SDOH — HEALTH STABILITY: PHYSICAL HEALTH: ON AVERAGE, HOW MANY MINUTES DO YOU ENGAGE IN EXERCISE AT THIS LEVEL?: 30 MIN

## 2025-05-14 ENCOUNTER — OFFICE VISIT (OUTPATIENT)
Dept: INTERNAL MEDICINE CLINIC | Facility: CLINIC | Age: 55
End: 2025-05-14
Payer: COMMERCIAL

## 2025-05-14 VITALS
HEART RATE: 64 BPM | HEIGHT: 60 IN | BODY MASS INDEX: 34.55 KG/M2 | WEIGHT: 176 LBS | SYSTOLIC BLOOD PRESSURE: 144 MMHG | DIASTOLIC BLOOD PRESSURE: 84 MMHG | OXYGEN SATURATION: 99 %

## 2025-05-14 DIAGNOSIS — R73.01 IMPAIRED FASTING GLUCOSE: ICD-10-CM

## 2025-05-14 DIAGNOSIS — Z13.220 LIPID SCREENING: ICD-10-CM

## 2025-05-14 DIAGNOSIS — I10 ESSENTIAL HYPERTENSION WITH GOAL BLOOD PRESSURE LESS THAN 140/90: ICD-10-CM

## 2025-05-14 DIAGNOSIS — I10 ESSENTIAL HYPERTENSION WITH GOAL BLOOD PRESSURE LESS THAN 140/90: Primary | ICD-10-CM

## 2025-05-14 DIAGNOSIS — Z12.31 ENCOUNTER FOR SCREENING MAMMOGRAM FOR MALIGNANT NEOPLASM OF BREAST: ICD-10-CM

## 2025-05-14 DIAGNOSIS — D50.0 IRON DEFICIENCY ANEMIA DUE TO CHRONIC BLOOD LOSS: ICD-10-CM

## 2025-05-14 DIAGNOSIS — Z12.11 COLON CANCER SCREENING: ICD-10-CM

## 2025-05-14 DIAGNOSIS — E66.01 SEVERE OBESITY WITH BODY MASS INDEX (BMI) OF 35.0 TO 39.9 WITH SERIOUS COMORBIDITY (HCC): ICD-10-CM

## 2025-05-14 PROBLEM — A60.00 GENITAL HERPES: Status: ACTIVE | Noted: 2025-05-14

## 2025-05-14 LAB
ALBUMIN SERPL-MCNC: 3.9 G/DL (ref 3.5–5)
ALBUMIN/GLOB SERPL: 1.1 (ref 1–1.9)
ALP SERPL-CCNC: 99 U/L (ref 35–104)
ALT SERPL-CCNC: 29 U/L (ref 8–45)
ANION GAP SERPL CALC-SCNC: 11 MMOL/L (ref 7–16)
AST SERPL-CCNC: 25 U/L (ref 15–37)
BASOPHILS # BLD: 0.03 K/UL (ref 0–0.2)
BASOPHILS NFR BLD: 0.6 % (ref 0–2)
BILIRUB SERPL-MCNC: 0.5 MG/DL (ref 0–1.2)
BUN SERPL-MCNC: 9 MG/DL (ref 6–23)
CALCIUM SERPL-MCNC: 9.8 MG/DL (ref 8.8–10.2)
CHLORIDE SERPL-SCNC: 99 MMOL/L (ref 98–107)
CHOLEST SERPL-MCNC: 259 MG/DL (ref 0–200)
CO2 SERPL-SCNC: 27 MMOL/L (ref 20–29)
CREAT SERPL-MCNC: 0.81 MG/DL (ref 0.6–1.1)
DIFFERENTIAL METHOD BLD: ABNORMAL
EOSINOPHIL # BLD: 0.07 K/UL (ref 0–0.8)
EOSINOPHIL NFR BLD: 1.5 % (ref 0.5–7.8)
ERYTHROCYTE [DISTWIDTH] IN BLOOD BY AUTOMATED COUNT: 13.2 % (ref 11.9–14.6)
EST. AVERAGE GLUCOSE BLD GHB EST-MCNC: 361 MG/DL
FERRITIN SERPL-MCNC: 85 NG/ML (ref 8–388)
GLOBULIN SER CALC-MCNC: 3.4 G/DL (ref 2.3–3.5)
GLUCOSE SERPL-MCNC: 232 MG/DL (ref 70–99)
HBA1C MFR BLD: 14.2 % (ref 0–5.6)
HCT VFR BLD AUTO: 44.7 % (ref 35.8–46.3)
HDLC SERPL-MCNC: 49 MG/DL (ref 40–60)
HDLC SERPL: 5.3 (ref 0–5)
HGB BLD-MCNC: 14.7 G/DL (ref 11.7–15.4)
IMM GRANULOCYTES # BLD AUTO: 0.02 K/UL (ref 0–0.5)
IMM GRANULOCYTES NFR BLD AUTO: 0.4 % (ref 0–5)
IRON SATN MFR SERPL: 44 % (ref 20–50)
IRON SERPL-MCNC: 138 UG/DL (ref 35–100)
LDLC SERPL CALC-MCNC: 190 MG/DL (ref 0–100)
LYMPHOCYTES # BLD: 1.34 K/UL (ref 0.5–4.6)
LYMPHOCYTES NFR BLD: 27.9 % (ref 13–44)
MCH RBC QN AUTO: 27.1 PG (ref 26.1–32.9)
MCHC RBC AUTO-ENTMCNC: 32.9 G/DL (ref 31.4–35)
MCV RBC AUTO: 82.5 FL (ref 82–102)
MONOCYTES # BLD: 0.4 K/UL (ref 0.1–1.3)
MONOCYTES NFR BLD: 8.3 % (ref 4–12)
NEUTS SEG # BLD: 2.95 K/UL (ref 1.7–8.2)
NEUTS SEG NFR BLD: 61.3 % (ref 43–78)
NRBC # BLD: 0 K/UL (ref 0–0.2)
PLATELET # BLD AUTO: 165 K/UL (ref 150–450)
PMV BLD AUTO: 12.8 FL (ref 9.4–12.3)
POTASSIUM SERPL-SCNC: 4.1 MMOL/L (ref 3.5–5.1)
PROT SERPL-MCNC: 7.4 G/DL (ref 6.3–8.2)
RBC # BLD AUTO: 5.42 M/UL (ref 4.05–5.2)
SODIUM SERPL-SCNC: 138 MMOL/L (ref 136–145)
TIBC SERPL-MCNC: 314 UG/DL (ref 240–450)
TRIGL SERPL-MCNC: 102 MG/DL (ref 0–150)
TSH W FREE THYROID IF ABNORMAL: 0.39 UIU/ML (ref 0.27–4.2)
UIBC SERPL-MCNC: 176 UG/DL (ref 112–347)
VLDLC SERPL CALC-MCNC: 20 MG/DL (ref 6–23)
WBC # BLD AUTO: 4.8 K/UL (ref 4.3–11.1)

## 2025-05-14 PROCEDURE — 3079F DIAST BP 80-89 MM HG: CPT

## 2025-05-14 PROCEDURE — 99214 OFFICE O/P EST MOD 30 MIN: CPT

## 2025-05-14 PROCEDURE — 3077F SYST BP >= 140 MM HG: CPT

## 2025-05-14 RX ORDER — CHLORTHALIDONE 25 MG/1
25 TABLET ORAL DAILY
Qty: 90 TABLET | Refills: 1 | Status: SHIPPED | OUTPATIENT
Start: 2025-05-14

## 2025-05-14 RX ORDER — AMLODIPINE BESYLATE 5 MG/1
5 TABLET ORAL DAILY
Qty: 90 TABLET | Refills: 1 | Status: SHIPPED | OUTPATIENT
Start: 2025-05-14

## 2025-05-14 SDOH — ECONOMIC STABILITY: FOOD INSECURITY: WITHIN THE PAST 12 MONTHS, THE FOOD YOU BOUGHT JUST DIDN'T LAST AND YOU DIDN'T HAVE MONEY TO GET MORE.: NEVER TRUE

## 2025-05-14 SDOH — ECONOMIC STABILITY: FOOD INSECURITY: WITHIN THE PAST 12 MONTHS, YOU WORRIED THAT YOUR FOOD WOULD RUN OUT BEFORE YOU GOT MONEY TO BUY MORE.: NEVER TRUE

## 2025-05-14 ASSESSMENT — PATIENT HEALTH QUESTIONNAIRE - PHQ9
SUM OF ALL RESPONSES TO PHQ QUESTIONS 1-9: 0
1. LITTLE INTEREST OR PLEASURE IN DOING THINGS: NOT AT ALL
SUM OF ALL RESPONSES TO PHQ QUESTIONS 1-9: 0
SUM OF ALL RESPONSES TO PHQ QUESTIONS 1-9: 0
2. FEELING DOWN, DEPRESSED OR HOPELESS: NOT AT ALL
SUM OF ALL RESPONSES TO PHQ QUESTIONS 1-9: 0

## 2025-05-14 ASSESSMENT — VISUAL ACUITY: OU: 1

## 2025-05-14 ASSESSMENT — ENCOUNTER SYMPTOMS
CHEST TIGHTNESS: 0
SHORTNESS OF BREATH: 0

## 2025-05-14 NOTE — PROGRESS NOTES
Janell Alexandra (:  1970) is a 54 y.o. female,Established patient, here for evaluation of the following chief complaint(s):  Established New Doctor    Patient Active Problem List   Diagnosis    Essential hypertension with goal blood pressure less than 140/90    Iron deficiency anemia due to chronic blood loss    Sickle cell trait    Impaired fasting glucose    AMA (advanced maternal age) multigravida 35+    Hx gestational diabetes    Absolute anemia    Genital herpes    Severe obesity with body mass index (BMI) of 35.0 to 39.9 with serious comorbidity (HCC)    Essential (primary) hypertension     27:  TONYA- Started in . Has had infusions in the past as well. Denies SOB, fatigue, and WOLF. Taking oral iron replacement- takes 2-3 times a week.   HTN- BP is slightly elevated today. Tolerating medication well, but only taking 3-4 days a week. Pt denies adverse effects of medication, as well as chest pain, dizziness, palpitations, swelling, and headache.    Impaired fasting glucose- Denies polyuria/phagia/dipsia. Denies Recent hypoglycemic events. Would like to lose weight. Hasn't been working out very much.   Has visit with eye doctor tomorrow. Right eye is a bit blurry. Left eye (weaker eye) has always had problems.   Breasts have been tender. Still having menstrual cycles. Has had some hot flashes as well.        Review of Systems   Constitutional:  Negative for chills, fever and unexpected weight change.   Eyes:  Positive for visual disturbance.   Respiratory:  Negative for chest tightness and shortness of breath.    Cardiovascular:  Negative for chest pain and palpitations.   Endocrine: Negative for cold intolerance and heat intolerance.   Genitourinary:  Positive for vaginal discharge.   Skin:  Negative for rash.   Psychiatric/Behavioral:  Negative for agitation, dysphoric mood, sleep disturbance and suicidal ideas. The patient is not nervous/anxious.       Visit Vitals  BP (!) 144/84 (BP

## 2025-05-15 ENCOUNTER — TELEPHONE (OUTPATIENT)
Dept: INTERNAL MEDICINE CLINIC | Facility: CLINIC | Age: 55
End: 2025-05-15

## 2025-05-15 ENCOUNTER — RESULTS FOLLOW-UP (OUTPATIENT)
Dept: INTERNAL MEDICINE CLINIC | Facility: CLINIC | Age: 55
End: 2025-05-15

## 2025-05-15 DIAGNOSIS — E11.65 CONTROLLED TYPE 2 DIABETES MELLITUS WITH HYPERGLYCEMIA, WITHOUT LONG-TERM CURRENT USE OF INSULIN (HCC): Primary | ICD-10-CM

## 2025-05-15 DIAGNOSIS — E78.2 MIXED HYPERLIPIDEMIA: ICD-10-CM

## 2025-05-15 NOTE — TELEPHONE ENCOUNTER
----- Message from NIKHIL Greer sent at 5/15/2025  2:44 PM EDT -----  Pt needs to schedule f/u to discuss diabetes and medications. Please schedule in 1st available virtual.

## 2025-05-15 NOTE — TELEPHONE ENCOUNTER
LVM letting patient know Sun[facility] has been seeing patients all morning and has not had a chance to reviewed labs.

## 2025-05-19 ENCOUNTER — PATIENT MESSAGE (OUTPATIENT)
Dept: INTERNAL MEDICINE CLINIC | Facility: CLINIC | Age: 55
End: 2025-05-19

## 2025-05-19 ENCOUNTER — TELEMEDICINE (OUTPATIENT)
Dept: INTERNAL MEDICINE CLINIC | Facility: CLINIC | Age: 55
End: 2025-05-19
Payer: COMMERCIAL

## 2025-05-19 DIAGNOSIS — E78.2 MIXED HYPERLIPIDEMIA: ICD-10-CM

## 2025-05-19 DIAGNOSIS — Z12.31 SCREENING MAMMOGRAM FOR BREAST CANCER: ICD-10-CM

## 2025-05-19 DIAGNOSIS — E11.65 UNCONTROLLED TYPE 2 DIABETES MELLITUS WITH HYPERGLYCEMIA (HCC): Primary | ICD-10-CM

## 2025-05-19 DIAGNOSIS — E11.65 CONTROLLED TYPE 2 DIABETES MELLITUS WITH HYPERGLYCEMIA, WITHOUT LONG-TERM CURRENT USE OF INSULIN (HCC): Primary | ICD-10-CM

## 2025-05-19 PROCEDURE — 99214 OFFICE O/P EST MOD 30 MIN: CPT

## 2025-05-19 RX ORDER — INSULIN HUMAN 100 [IU]/ML
16 INJECTION, SUSPENSION SUBCUTANEOUS NIGHTLY
Qty: 5 ADJUSTABLE DOSE PRE-FILLED PEN SYRINGE | Refills: 3 | Status: SHIPPED | OUTPATIENT
Start: 2025-05-19 | End: 2025-05-20

## 2025-05-19 RX ORDER — METFORMIN HYDROCHLORIDE 500 MG/1
500 TABLET, EXTENDED RELEASE ORAL 2 TIMES DAILY WITH MEALS
Qty: 180 TABLET | Refills: 1 | Status: SHIPPED | OUTPATIENT
Start: 2025-05-19

## 2025-05-19 ASSESSMENT — ENCOUNTER SYMPTOMS
COUGH: 0
SHORTNESS OF BREATH: 0

## 2025-05-19 ASSESSMENT — PATIENT HEALTH QUESTIONNAIRE - PHQ9
SUM OF ALL RESPONSES TO PHQ QUESTIONS 1-9: 0
1. LITTLE INTEREST OR PLEASURE IN DOING THINGS: NOT AT ALL
2. FEELING DOWN, DEPRESSED OR HOPELESS: NOT AT ALL
SUM OF ALL RESPONSES TO PHQ QUESTIONS 1-9: 0

## 2025-05-19 NOTE — ASSESSMENT & PLAN NOTE
Discussed following a low-fat diet and increasing exercise to improve cholesterol and blood sugar management. Plan to recheck fasting lipid panel in 6 months.

## 2025-05-19 NOTE — ASSESSMENT & PLAN NOTE
Uncontrolled. Start Mounjaro, metformin, and NPH. Every 3 days pt will increase her insulin by 2 units until she has consistent fasting blood sugars <140. Plan to pull back insulin as Mounjaro becomes therapeutic.     Orders:    metFORMIN (GLUCOPHAGE-XR) 500 MG extended release tablet; Take 1 tablet by mouth with breakfast and with evening meal    insulin NPH (HUMULIN N KWIKPEN) 100 UNIT/ML injection pen; Inject 16 Units into the skin nightly    blood glucose monitor kit and supplies; Dispense sufficient amount for indicated testing frequency plus additional to accommodate PRN testing needs. Dispense all needed supplies to include: monitor, strips, lancing device, lancets, control solutions, alcohol swabs.    Insulin Pen Needle 31G X 5 MM MISC; Use with insulin up to 5 times daily, E10.65    Tirzepatide (MOUNJARO) 2.5 MG/0.5ML SOAJ pen; Inject 2.5 mg into the skin every 7 days    Tirzepatide (MOUNJARO) 5 MG/0.5ML SOAJ pen; Inject 5 mg into the skin every 7 days    Tirzepatide (MOUNJARO) 7.5 MG/0.5ML SOAJ pen; Inject 7.5 mg into the skin every 7 days

## 2025-05-19 NOTE — PROGRESS NOTES
Respiratory:  Negative for cough and shortness of breath.    Cardiovascular:  Negative for chest pain and palpitations.   Endocrine: Positive for polydipsia and polyuria.     Objective   Patient-Reported Vitals  No data recorded     Physical Exam  Vitals and nursing note reviewed.   Constitutional:       Appearance: Normal appearance.   HENT:      Head: Normocephalic.   Eyes:      General: Lids are normal. Gaze aligned appropriately.   Pulmonary:      Effort: Pulmonary effort is normal. No respiratory distress.   Neurological:      Mental Status: She is alert and oriented to person, place, and time.   Psychiatric:         Attention and Perception: Attention and perception normal.         Mood and Affect: Mood normal.         Behavior: Behavior normal. Behavior is cooperative.                  --NIKHIL Greer

## 2025-05-20 RX ORDER — HUMAN INSULIN 100 [IU]/ML
16 INJECTION, SUSPENSION SUBCUTANEOUS NIGHTLY
Qty: 15 ADJUSTABLE DOSE PRE-FILLED PEN SYRINGE | Refills: 3 | Status: SHIPPED | OUTPATIENT
Start: 2025-05-20

## 2025-05-20 NOTE — TELEPHONE ENCOUNTER
Please advise. Patient's insurance will not over Humulin. Pharmacy states they will cover Novolin.

## 2025-05-20 NOTE — TELEPHONE ENCOUNTER
[facility] in [employer] called stating patients insurance will cover Novolin Flexpen, not the vial.     Please advise.